# Patient Record
Sex: FEMALE | Race: WHITE
[De-identification: names, ages, dates, MRNs, and addresses within clinical notes are randomized per-mention and may not be internally consistent; named-entity substitution may affect disease eponyms.]

---

## 2019-02-19 ENCOUNTER — HOSPITAL ENCOUNTER (EMERGENCY)
Dept: HOSPITAL 25 - UCCORT | Age: 59
Discharge: HOME | End: 2019-02-19
Payer: COMMERCIAL

## 2019-02-19 VITALS — SYSTOLIC BLOOD PRESSURE: 109 MMHG | DIASTOLIC BLOOD PRESSURE: 66 MMHG

## 2019-02-19 DIAGNOSIS — Z88.8: ICD-10-CM

## 2019-02-19 DIAGNOSIS — Z79.899: ICD-10-CM

## 2019-02-19 DIAGNOSIS — E11.9: ICD-10-CM

## 2019-02-19 DIAGNOSIS — K21.9: ICD-10-CM

## 2019-02-19 DIAGNOSIS — R11.10: ICD-10-CM

## 2019-02-19 DIAGNOSIS — I10: ICD-10-CM

## 2019-02-19 DIAGNOSIS — J45.909: ICD-10-CM

## 2019-02-19 DIAGNOSIS — Z88.2: ICD-10-CM

## 2019-02-19 DIAGNOSIS — J10.1: Primary | ICD-10-CM

## 2019-02-19 DIAGNOSIS — Z79.51: ICD-10-CM

## 2019-02-19 DIAGNOSIS — Z88.5: ICD-10-CM

## 2019-02-19 DIAGNOSIS — Z79.4: ICD-10-CM

## 2019-02-19 LAB — FLUAV RNA SPEC QL NAA+PROBE: POSITIVE

## 2019-02-19 PROCEDURE — G0463 HOSPITAL OUTPT CLINIC VISIT: HCPCS

## 2019-02-19 PROCEDURE — 99212 OFFICE O/P EST SF 10 MIN: CPT

## 2019-02-19 NOTE — UC
FLU HPI





- HPI Summary


HPI Summary: 


58-year-old female with significant chronic medical problems including asthma 

presents with onset of fever, chills, sore throat, body aches, nasal congestion

, and nonproductive cough 4 days ago.  Reports some chest tightness and chest 

wall pain with coughing.  Had one episode of posttussive emesis yesterday.  

States she has not needed to use her rescue inhaler. Denies ear pain, dysphagia

, palpitations, abdominal pain, nausea, or diarrhea.








- History of Current Complaint


Chief Complaint: UCGeneralIllness


Stated Complaint: COUGH


Time Seen by Provider: 02/19/19 13:31


Hx Obtained From: Patient


Pain Intensity: 10





- Allergy/Home Medications


Allergies/Adverse Reactions: 


 Allergies











Allergy/AdvReac Type Severity Reaction Status Date / Time


 


codeine Allergy  See Comment Verified 02/19/19 13:26


 


prednisone Allergy  Difficulty Verified 02/19/19 13:26





   Breathing  


 


Sulfa (Sulfonamide Allergy  GI Upset Verified 02/19/19 13:26





Antibiotics)     











Home Medications: 


 Home Medications





Albuterol HFA INHALER* [Ventolin HFA Inhaler*] 2 puff INH Q6H PRN 02/19/19 [

History Confirmed 02/19/19]


Amitriptyline TAB* [Elavil TAB*] 25 mg PO BEDTIME 02/19/19 [History Confirmed 02 /19/19]


Aspirin EC TAB* [Ecotrin EC Low Dose 81 MG*] 81 mg PO DAILY 02/19/19 [History 

Confirmed 02/19/19]


Atorvastatin* [Lipitor 80 MG*] 80 mg PO QPM 02/19/19 [History Confirmed 02/19/19

]


Cholecalciferol TAB* [Vitamin D TAB*] 1,000 unit PO BID 02/19/19 [History 

Confirmed 02/19/19]


FLUoxetine CAP* [Prozac CAP*] 60 mg PO BEDTIME 02/19/19 [History Confirmed 02/19 /19]


Fenofibrate,Micronized [Fenofibrate] 134 mg PO DAILY 02/19/19 [History 

Confirmed 02/19/19]


Gabapentin 300 mg PO TID 02/19/19 [History Confirmed 02/19/19]


Insulin Glargine,Hum.rec.anlog [Basaglar Kwikpen U-100] 30 unit SC BEDTIME 02/19 /19 [History Confirmed 02/19/19]


Lisinopril [Lisinopril 2.5 MG-] 2.5 mg PO DAILY 02/19/19 [History Confirmed 02/ 19/19]


Multivitamin [Multivitamins] 1 cap PO DAILY 02/19/19 [History Confirmed 02/19/19

]


Nicotine PATCH 21 MG/24 HR* 21 mg TRANSDERM DAILY 02/19/19 [History Confirmed 02 /19/19]


Pantoprazole TAB * [Protonix TAB*] 40 mg PO DAILY 02/19/19 [History Confirmed 02 /19/19]


Propranolol HCl [Propranolol HCl ER] 60 mg PO DAILY 02/19/19 [History Confirmed 

02/19/19]


Varenicline (NF) [Chantix 1 MG TAB (NF)] 1 mg PO BID 02/19/19 [History 

Confirmed 02/19/19]


Vitamin A 8,000 unit PO DAILY 02/19/19 [History Confirmed 02/19/19]


Zolpidem TAB* [Ambien*] 10 mg PO BEDTIME PRN 02/19/19 [History Confirmed 02/19/ 19]


metFORMIN* [Glucophage 1000 MG TAB *] 1,000 mg PO BID 02/19/19 [History 

Confirmed 02/19/19]











PMH/Surg Hx/FS Hx/Imm Hx


Endocrine History: Diabetes, Dyslipidemia


Cardiovascular History: Hypertension


Respiratory History: Asthma


GI/ History: Gastroesophageal Reflux


Psychological History: Depression





- Surgical History


Surgical History: Yes


Surgery Procedure, Year, and Place: Trigger finger releases both hands.  Right 

knee sx.  Left elbow sx





- Family History


Known Family History: Positive: Non-Contributory





- Social History


Occupation: Unemployed


Lives: Alone


Alcohol Use: None


Substance Use Type: None


Smoking Status (MU): Former Smoker


When Did the Patient Quit Smoking/Using Tobacco: 2 wks ago





Review of Systems


All Other Systems Reviewed And Are Negative: Yes


Constitutional: Positive: Fever, Chills


Skin: Negative: Rash


Eyes: Negative: Drainage, Eye Redness


ENT: Positive: Sore Throat, Nasal Discharge, Sinus Congestion.  Negative: Ear 

Ache, Sinus Pain/Tenderness


Respiratory: Positive: Cough.  Negative: Shortness Of Breath


Cardiovascular: Negative: Palpitations, Chest Pain


Gastrointestinal: Negative: Abdominal Pain, Vomiting, Diarrhea, Nausea


Genitourinary: Positive: Negative


Musculoskeletal: Positive: Myalgia


Neurological: Positive: Negative


Is Patient Immunocompromised?: No





Physical Exam





- Summary


Physical Exam Summary: 


GENERAL APPEARANCE: Well developed, well nourished, alert and cooperative, and 

appears to be in no acute distress.





EYES: Conjunctiva clear. No discharge. Vision is grossly intact.





EARS: External auditory canals and tympanic membranes clear, hearing grossly 

intact.





NOSE: Mild nasal congestion. No nasal discharge.





THROAT: Mild pharyngeal erythema. No tonsilar inflammation, swelling, exudate, 

or lesions.





NECK: Neck supple, non-tender without lymphadenopathy.





CARDIAC: Normal S1 and S2. No S3, S4 or murmurs. Rhythm is regular. There is no 

peripheral edema, cyanosis or pallor. Extremities are warm and well perfused. 

Capillary refill is less than 2 seconds.





LUNGS: Chest non-tender. Clear to auscultation without rales, rhonchi, wheezing 

or diminished breath sounds.





ABDOMEN: Positive bowel sounds. Soft, nondistended, nontender. No guarding or 

rebound. No masses or hepatosplenomegally.





MUSKULOSKELETAL: ROM intact to all extremities. No joint erythema or 

tenderness. Normal muscular development. Normal gait.





SKIN: Skin normal color, texture and turgor with no lesions or eruptions.





Triage Information Reviewed: Yes


Vital Signs: 


 Initial Vital Signs











Temp  97.7 F   02/19/19 13:31


 


Pulse  68   02/19/19 13:31


 


Resp  22   02/19/19 13:31


 


BP  109/66   02/19/19 13:31


 


Pulse Ox  99   02/19/19 13:31











Vital Signs Reviewed: Yes





Diagnostics





- Laboratory


Diagnostic Studies Completed/Ordered: Influenza A positive





Flu Course/Dx





- Course


Course Of Treatment: 58-year-old female with significant chronic medical 

problems including asthma presents with onset of fever, chills, sore throat, 

body aches, nasal congestion, and nonproductive cough 4 days ago.  Reports some 

chest tightness and chest wall pain with coughing.  Had one episode of 

posttussive emesis yesterday.  States she has not needed to use her rescue 

inhaler. Denies ear pain, dysphagia, palpitations, abdominal pain, nausea, or 

diarrhea.  Afebrile.  Vital signs stable.  Exam reveals a chronically ill-

appearing female in no acute distress with mild nasal congestion, mild 

pharyngeal erythema without tonsillar swelling or exudate, no cervical 

lymphadenopathy, bilateral clear breath sounds, and a dry nonproductive cough.  

Rapid flu positive for influenza A.  With her chronic illnesses I am 

recommending that she start Tamiflu 75 mg twice a day 5 days even though she 

is outside the normal window.  She is to follow-up with her primary care 

provider in 7 days if no improvement in symptoms.  Anticipatory guidance and 

warning symptoms reviewed with the patient.  Verbalizes understanding and 

agrees with plan of care.





- Differential Dx/Diagnosis


Differential Diagnosis/HQI/PQRI: Bronchitis, Influenza, Pneumonia, Upper 

Respiratory Infection


Provider Diagnosis: 


 Influenza A, Asthma








Discharge





- Sign-Out/Discharge


Documenting (check all that apply): Patient Departure


All imaging exams completed and their final reports reviewed: No Studies





- Discharge Plan


Condition: Stable


Disposition: HOME


Prescriptions: 


Oseltamivir CAP* [Tamiflu CAP*] 75 mg PO BID #10 cap


Patient Education Materials:  Influenza (ED)


Referrals: 


Audrey Pastrana MD [Primary Care Provider] - 7 Days (If no improvement 

in symptoms.)


Additional Instructions: 


Your flu test in the clinic today was positive for influenza A.





Start Tamiflu 1 cap every 12 hours for 5 days.





Continue your other medications as directed.





Get plenty of rest.





Drink plenty of fluids to avoid dehydration especially if you are running any 

fever.





Take over the counter acetaminophen (Tylenol) or ibuprofen (Advil, Motrin) 

according to directions as needed for pain or fever.





Use salt water gargles several times a day if you have a sore throat.





You may also use Chloraseptic spray or Cepacol lonzenges according to 

directions which contain a numbing medication and can provide some temporary 

relief from your sore throat.





Follow up with your primary care provider in 7 days if symptoms persist.





Seek immediate medical attention in the emergency room if you have fever 

greater than 100.5 F despite taking acetaminophen or ibuprofen, have chest pain

, difficulty breathing, are unable to swallow, or have any worsening of 

symptoms.





- Billing Disposition and Condition


Condition: STABLE


Disposition: Home

## 2019-06-11 ENCOUNTER — HOSPITAL ENCOUNTER (EMERGENCY)
Dept: HOSPITAL 25 - UCCORT | Age: 59
Discharge: HOME HEALTH | End: 2019-06-11
Payer: COMMERCIAL

## 2019-06-11 VITALS — DIASTOLIC BLOOD PRESSURE: 57 MMHG | SYSTOLIC BLOOD PRESSURE: 132 MMHG

## 2019-06-11 DIAGNOSIS — I10: ICD-10-CM

## 2019-06-11 DIAGNOSIS — R10.31: Primary | ICD-10-CM

## 2019-06-11 DIAGNOSIS — R10.32: ICD-10-CM

## 2019-06-11 DIAGNOSIS — E11.9: ICD-10-CM

## 2019-06-11 DIAGNOSIS — Z87.891: ICD-10-CM

## 2019-06-11 PROCEDURE — 99212 OFFICE O/P EST SF 10 MIN: CPT

## 2019-06-11 PROCEDURE — G0463 HOSPITAL OUTPT CLINIC VISIT: HCPCS

## 2019-06-11 PROCEDURE — 81003 URINALYSIS AUTO W/O SCOPE: CPT

## 2019-06-11 NOTE — UC
Abdominal Pain Female HPI





- HPI Summary


HPI Summary: 





57 y/o female w/ PMHX DM II  presents to the urgent care c/o cough for the past 

4 days. Then she developed diarrhea w/ N/V and B/L  lower abdominal pain 


Vomiting, diarrhea (thin and green), low abdominal pain for 3 days; nausea 

started 4 days ago; pt has nausea now





- History of Current Complaint


Chief Complaint: UCGI


Stated Complaint: VOMITING x3 DAYS,DIARRHEA


Time Seen by Provider: 06/11/19 16:05


Hx Obtained From: Patient


Hx Last Menstrual Period: 6 yrs


Pregnant?: No


Onset/Duration: Gradual Onset, Lasting Days - 4 days, Still Present, Worse 

Since - today several episodes of N/V and diarrhea


Severity Initially: Mild


Severity Currently: Severe


Pain Intensity: 9


Pain Scale Used: 0-10 Numeric


Location: Other - B/L lower acute abdominal pain


Radiates: No


Character: Colicy


Aggravating Factor(s): Food


Alleviating Factor(s): Nothing


Associated Signs and Symptoms: Positive: Cough, Decreased Appetite, Nausea, 

Vomiting, Diarrhea.  Negative: Fever, Back Pain, Constipation, Blood in Stool, 

Vaginal Bleeding, Vaginal Discharge





- Risk Factors


Ectopic Pregnancy Risk Factor: Negative


Ovarian Torsion Risk Factor: Negative


Allergies/Adverse Reactions: 


 Allergies











Allergy/AdvReac Type Severity Reaction Status Date / Time


 


codeine Allergy  See Comment Verified 06/11/19 15:10


 


prednisone Allergy  Difficulty Verified 06/11/19 15:10





   Breathing  


 


Sulfa (Sulfonamide Allergy  GI Upset Verified 06/11/19 15:10





Antibiotics)     











Home Medications: 


 Home Medications





guaiFENesin [Mucinex] 600 mg PO Q12H PRN 06/11/19 [History Confirmed 06/11/19]











PMH/Surg Hx/FS Hx/Imm Hx


Previously Healthy: Yes


Endocrine History: Diabetes, Dyslipidemia


Cardiovascular History: Hypertension


GI/ History: Gastroesophageal Reflux


Psychological History: Depression





- Surgical History


Surgical History: Yes


Surgery Procedure, Year, and Place: Trigger finger releases both hands.  Right 

knee sx.  Left elbow sx





- Family History


Known Family History: Positive: Diabetes, Non-Contributory





- Social History


Occupation: Unemployed


Lives: With Family


Alcohol Use: None


Substance Use Type: None


Smoking Status (MU): Former Smoker


When Did the Patient Quit Smoking/Using Tobacco: 2 wks ago





Review of Systems


All Other Systems Reviewed And Are Negative: Yes


Constitutional: Positive: Fatigue, Other - decrease appetite


Skin: Positive: Negative


Eyes: Positive: Negative


ENT: Positive: Negative


Respiratory: Positive: Cough - productive


Cardiovascular: Positive: Negative


Gastrointestinal: Positive: Abdominal Pain - B/L lower acute abdominal pain, 

Vomiting - 6 episodes today, Diarrhea -  4 episodes today, Nausea


Genitourinary: Positive: Negative


Motor: Positive: Negative


Neurovascular: Positive: Negative


Musculoskeletal: Positive: Negative


Neurological: Positive: Negative


Psychological: Positive: Negative


Is Patient Immunocompromised?: No





Physical Exam





- Summary


Physical Exam Summary: 





Vital Signs Reviewed: Yes


General:Patient is a well developed and nourished obese female who is sitting 

comfortable in the examining table.   Patient is not in any acute respiratory 

distress. 


Eyes: Positive: Conjunctiva Clear - PERRLA, EOMI, fundi grossly normal


ENT: Positive: Normal ENT inspection, Hearing grossly normal, Pharynx normal, 

TMs normal


Neck: Positive: Supple, Nontender, No Lymphadenopathy


Respiratory: Positive: Chest non-tender, Lungs clear, Normal breath sounds, No 

respiratory distress


Cardiovascular: Positive: RRR,S1 and S2 present, No Murmur, Pulses Normal, 

Brisk Capillary Refill


Abdomen Description: Positive: Nontender, Other: - Abd: Flat with no 

distention. No surface trauma, scars, incisions.  hyperactive bowel sounds  

present in all four quadrants.  No tenderness, guarding, rigidity to palpation. 

No masses palpated, no pulsation in epigastric area.  No organomegaly.  

Negative [place] signs.  No periumbilical tenderness.  No rebound in the lower 

quadrants.  NT over [place] point.  Left side suprapubic tenderness with  no 

distension.  Good femoral pulses bilaterally.  No hernia noted. No CVAT 

bilaterally


Musculoskeletal: Positive: Strength Intact, ROM Intact, No Edema,FROM in all 

major joints, no edema, no cyanosis or clubbing.


Neuro: Alert and oriented x 3. No acute neurological deficits. Speech is normal.


Psychological: WNL


Skin: Dry and warm





Triage Information Reviewed: Yes


Vital Signs: 


 Initial Vital Signs











Temp  97.9 F   06/11/19 15:14


 


Pulse  89   06/11/19 15:14


 


Resp  18   06/11/19 15:14


 


BP  132/57   06/11/19 15:14


 


Pulse Ox  99   06/11/19 15:14














Abd Pain Female Course/Dx





- Differential Dx/Diagnosis


Differential Diagnosis: Bowel Obstruction, Constipation, Diverticulitis, Peptic 

Ulcer Disease, Pneumonia, Renal Colic, Urinary Tract Infection


Provider Diagnosis: 


 Acute bilateral lower abdominal pain








Discharge





- Sign-Out/Discharge


Documenting (check all that apply): Patient Departure - Pt karenly recommended 

to go to the [facility] ER for further evaluation and treatment in her treatments


All imaging exams completed and their final reports reviewed: No Studies





- Discharge Plan


Condition: Stable


Disposition: HOME-RECOMMEND TO ED


Patient Education Materials:  Acute Abdominal Pain (ED)


Referrals: 


Audrey Pastrana MD [Primary Care Provider] - 


Additional Instructions: 


I think you need a higher level or care for your presenting symptoms. I highly 

recommend you to go to the ER  at [place] for further evaluation and 

treatment. The risks of not going can be death, sepsis, DKA,  I spoke to the ER

  attending Dr. Diony Sweet  . They are expecting you. 











- Billing Disposition and Condition


Condition: STABLE


Disposition: Home-Recommend to ED